# Patient Record
Sex: FEMALE | Race: WHITE | URBAN - METROPOLITAN AREA
[De-identification: names, ages, dates, MRNs, and addresses within clinical notes are randomized per-mention and may not be internally consistent; named-entity substitution may affect disease eponyms.]

---

## 2018-01-17 ENCOUNTER — TELEPHONE (OUTPATIENT)
Dept: ORTHOPEDIC SURGERY | Age: 53
End: 2018-01-17

## 2018-01-17 NOTE — TELEPHONE ENCOUNTER
Working on a request for medical records for continuation of care. Waiting for a reply from [place]) regarding paper chart.

## 2018-01-19 NOTE — TELEPHONE ENCOUNTER
Faxed complete medical records from 2005/2006 for Dr. Jerrie Ormond to Dr. Janice White @ [phone]